# Patient Record
Sex: FEMALE | Race: WHITE
[De-identification: names, ages, dates, MRNs, and addresses within clinical notes are randomized per-mention and may not be internally consistent; named-entity substitution may affect disease eponyms.]

---

## 2017-02-22 ENCOUNTER — HOSPITAL ENCOUNTER (OUTPATIENT)
Dept: HOSPITAL 17 - CLAB | Age: 52
End: 2017-02-22
Attending: FAMILY MEDICINE
Payer: COMMERCIAL

## 2017-02-22 DIAGNOSIS — R53.83: Primary | ICD-10-CM

## 2017-02-22 DIAGNOSIS — R94.7: ICD-10-CM

## 2017-02-22 DIAGNOSIS — E55.9: ICD-10-CM

## 2017-02-22 PROCEDURE — 84144 ASSAY OF PROGESTERONE: CPT

## 2017-02-22 PROCEDURE — 36415 COLL VENOUS BLD VENIPUNCTURE: CPT

## 2017-02-22 PROCEDURE — 82306 VITAMIN D 25 HYDROXY: CPT

## 2017-04-20 ENCOUNTER — HOSPITAL ENCOUNTER (OUTPATIENT)
Dept: HOSPITAL 17 - CLAB | Age: 52
End: 2017-04-20
Attending: FAMILY MEDICINE
Payer: COMMERCIAL

## 2017-04-20 DIAGNOSIS — E55.9: Primary | ICD-10-CM

## 2017-04-20 DIAGNOSIS — R94.7: ICD-10-CM

## 2017-04-20 DIAGNOSIS — R61: ICD-10-CM

## 2017-04-20 DIAGNOSIS — Z79.899: ICD-10-CM

## 2017-04-20 PROCEDURE — 36415 COLL VENOUS BLD VENIPUNCTURE: CPT

## 2017-04-20 PROCEDURE — 82306 VITAMIN D 25 HYDROXY: CPT

## 2017-04-20 PROCEDURE — 84144 ASSAY OF PROGESTERONE: CPT

## 2017-06-16 ENCOUNTER — HOSPITAL ENCOUNTER (EMERGENCY)
Dept: HOSPITAL 17 - NEPD | Age: 52
Discharge: HOME | End: 2017-06-16
Payer: COMMERCIAL

## 2017-06-16 VITALS
HEART RATE: 88 BPM | SYSTOLIC BLOOD PRESSURE: 127 MMHG | OXYGEN SATURATION: 96 % | DIASTOLIC BLOOD PRESSURE: 81 MMHG | TEMPERATURE: 98.1 F | RESPIRATION RATE: 16 BRPM

## 2017-06-16 DIAGNOSIS — R10.32: Primary | ICD-10-CM

## 2017-06-16 DIAGNOSIS — K44.9: ICD-10-CM

## 2017-06-16 DIAGNOSIS — E03.9: ICD-10-CM

## 2017-06-16 LAB
ALP SERPL-CCNC: 109 U/L (ref 45–117)
ALT SERPL-CCNC: 31 U/L (ref 10–53)
ANION GAP SERPL CALC-SCNC: 9 MEQ/L (ref 5–15)
APTT BLD: 24.7 SEC (ref 24.3–30.1)
AST SERPL-CCNC: 22 U/L (ref 15–37)
BACTERIA #/AREA URNS HPF: (no result) /HPF
BASOPHILS # BLD AUTO: 0.1 TH/MM3 (ref 0–0.2)
BASOPHILS NFR BLD: 1.1 % (ref 0–2)
BILIRUB SERPL-MCNC: 0.2 MG/DL (ref 0.2–1)
BUN SERPL-MCNC: 22 MG/DL (ref 7–18)
CHLORIDE SERPL-SCNC: 109 MEQ/L (ref 98–107)
COLOR UR: YELLOW
COMMENT (UR): (no result)
CULTURE IF INDICATED: (no result)
EOSINOPHIL # BLD: 0.2 TH/MM3 (ref 0–0.4)
EOSINOPHIL NFR BLD: 1.6 % (ref 0–4)
ERYTHROCYTE [DISTWIDTH] IN BLOOD BY AUTOMATED COUNT: 14.2 % (ref 11.6–17.2)
GFR SERPLBLD BASED ON 1.73 SQ M-ARVRAT: 68 ML/MIN (ref 89–?)
GLUCOSE UR STRIP-MCNC: (no result) MG/DL
HCO3 BLD-SCNC: 21.8 MEQ/L (ref 21–32)
HCT VFR BLD CALC: 39.5 % (ref 35–46)
HEMO FLAGS: (no result)
HGB UR QL STRIP: (no result)
INR PPP: 1 RATIO
KETONES UR STRIP-MCNC: (no result) MG/DL
LYMPHOCYTES # BLD AUTO: 3.8 TH/MM3 (ref 1–4.8)
LYMPHOCYTES NFR BLD AUTO: 32.5 % (ref 9–44)
MCH RBC QN AUTO: 29.2 PG (ref 27–34)
MCHC RBC AUTO-ENTMCNC: 32.7 % (ref 32–36)
MCV RBC AUTO: 89.2 FL (ref 80–100)
MONOCYTES NFR BLD: 5.5 % (ref 0–8)
MUCOUS THREADS #/AREA URNS LPF: (no result) /LPF
NEUTROPHILS # BLD AUTO: 7 TH/MM3 (ref 1.8–7.7)
NEUTROPHILS NFR BLD AUTO: 59.3 % (ref 16–70)
NITRITE UR QL STRIP: (no result)
PLATELET # BLD: 288 TH/MM3 (ref 150–450)
POTASSIUM SERPL-SCNC: 3.7 MEQ/L (ref 3.5–5.1)
PROTHROMBIN TIME: 11.1 SEC (ref 9.8–11.6)
RBC # BLD AUTO: 4.42 MIL/MM3 (ref 4–5.3)
SODIUM SERPL-SCNC: 140 MEQ/L (ref 136–145)
SP GR UR STRIP: 1.03 (ref 1–1.03)
SQUAMOUS #/AREA URNS HPF: 4 /HPF (ref 0–5)
WBC # BLD AUTO: 11.7 TH/MM3 (ref 4–11)

## 2017-06-16 PROCEDURE — 74176 CT ABD & PELVIS W/O CONTRAST: CPT

## 2017-06-16 PROCEDURE — 96374 THER/PROPH/DIAG INJ IV PUSH: CPT

## 2017-06-16 PROCEDURE — 81001 URINALYSIS AUTO W/SCOPE: CPT

## 2017-06-16 PROCEDURE — 96375 TX/PRO/DX INJ NEW DRUG ADDON: CPT

## 2017-06-16 PROCEDURE — 85025 COMPLETE CBC W/AUTO DIFF WBC: CPT

## 2017-06-16 PROCEDURE — 80053 COMPREHEN METABOLIC PANEL: CPT

## 2017-06-16 PROCEDURE — 85610 PROTHROMBIN TIME: CPT

## 2017-06-16 PROCEDURE — 96361 HYDRATE IV INFUSION ADD-ON: CPT

## 2017-06-16 PROCEDURE — 99285 EMERGENCY DEPT VISIT HI MDM: CPT

## 2017-06-16 PROCEDURE — 85730 THROMBOPLASTIN TIME PARTIAL: CPT

## 2017-06-16 NOTE — RADRPT
EXAM DATE/TIME:  06/16/2017 18:55 

 

[facility] COMPARISON:     

No previous studies available for comparison.

 

 

INDICATIONS :     

Left flank pain.

                  

 

ORAL CONTRAST:      

No oral contrast ingested.

                  

 

RADIATION DOSE:     

8.42 CTDIvol (mGy) 

 

 

MEDICAL HISTORY :     

None  

 

SURGICAL HISTORY :      

None. 

 

ENCOUNTER:      

Initial

 

ACUITY:      

1 day

 

PAIN SCALE:      

7/10

 

LOCATION:       

Left flank 

 

TECHNIQUE:     

Volumetric scanning of the abdomen and pelvis was performed.  Using automated exposure control and ad
justment of the mA and/or kV according to patient size, radiation dose was kept as low as reasonably 
achievable to obtain optimal diagnostic quality images. 

 

FINDINGS:     

 

LOWER LUNGS:     

The visualized lower lungs are clear.

 

LIVER:     

1.5 cm round hypodensity in the anterior right lobe of the liver. Liver is otherwise homogeneous and 
within normal limits. Gallbladder is within normal limits.

 

SPLEEN:     

Normal size without lesion.

 

PANCREAS:     

Within normal limits. 

 

KIDNEYS:     

2.7 x 1.7 cm parapelvic cyst in the left lower pole. No renal or ureteral calculi. No evidence of hyd
ronephrosis.

 

ADRENAL GLANDS:     

Within normal limits.

 

VASCULAR:     

There is no aortic aneurysm.

 

BOWEL/MESENTERY:     

No evidence of bowel dilatation. No free air or free fluid. Appendix within normal limits. Multiple n
onspecific subcentimeter central mesenteric lymph nodes in hazy mesenteric fat.

 

ABDOMINAL WALL:     

Within normal limits.

 

RETROPERITONEUM:     

There is no lymphadenopathy.

 

BLADDER:     

Diffuse nonspecific urinary bladder wall thickening.

 

REPRODUCTIVE:     

Within normal limits.

 

INGUINAL:     

There is no lymphadenopathy or hernia.

 

MUSCULOSKELETAL:     

Moderate severity facet arthrosis lower lumbar spine.

 

CONCLUSION:     

1. No renal or ureteral calculi. No hydronephrosis.

2. Nonspecific diffuse urinary bladder wall thickening.

3. Hypodensity in the right lobe of the liver statistically most likely to represent a cyst or sheron
ioma. Left renal parapelvic hypodensity most likely to  represent a parapelvic cyst.

 

 

 

 Anson Faust MD on June 16, 2017 at 19:14           

Board Certified Radiologist.

 This report was verified electronically.

## 2017-06-16 NOTE — PD
HPI


Chief Complaint:  Flank/Kidney Pain


Time Seen by Provider:  18:26


Travel History


International Travel<30 days:  No


Contact w/Intl Traveler<30days:  No


Traveled to known affect area:  No





History of Present Illness


HPI


51-year-old female complains of left flank pain.  Patient states that the pain 

started this morning.  Patient states the pain is sharp severe pain localized 

left flank area.  Patient denies any pain radiation.  Patient states that she 

has intermittent nausea vomiting with the pain.  Patient denies any dysuria or 

frequency.  Patient denies any vaginal discharge or bleeding.  Patient denies 

any fever chills.  Patient denies any history kidney stone.  Patient has 

history of achalasia and required endoscopy frequently in the past.  Patient 

has history hypothyroidism.  On a scale of 1-10 the pain is an 8.





PFSH


Past Medical History


Blood Disorders:  No


Anxiety:  No


Depression:  Yes


Heart Rhythm Problems:  No


Cancer:  No


Cardiovascular Problems:  No


High Cholesterol:  No


Chemotherapy:  No


Chest Pain:  No


Diabetes:  No


Diminished Hearing:  No


Endocrine:  Yes


Gastrointestinal Disorders:  Yes (GERD, DYSPHAGIA, MULTI. ESOPH. DILATIONS)


GERD:  Yes


Genitourinary:  No


Hepatitis:  No


Hiatal Hernia:  Yes


Hypertension:  No


Immune Disorder:  No


Musculoskeletal:  Yes (ARTHRITIS)


Neurologic:  No


Psychiatric:  No


Reproductive:  No


Respiratory:  No


Myocardial Infarction:  No


Radiation Therapy:  No


Thyroid Disease:  Yes (HYPO)


Menopausal:  Yes


:  2


Para:  2


Tubal Ligation:  Yes





Past Surgical History


AICD:  No


Gynecologic Surgery:  Yes (TUBAL LIG., HYSTERECTOMY)


Hysterectomy:  Yes


Joint Replacement:  No


Pacemaker:  No


Other Surgery:  Yes (HYSTERECTOMY, TUBAL LIGATION)





Social History


Alcohol Use:  No


Tobacco Use:  No


Substance Use:  No





Allergies-Medications


(Allergen,Severity, Reaction):  


Coded Allergies:  


     No Known Allergies (Verified , 10/27/16)


Reported Meds & Prescriptions





Reported Meds & Active Scripts


Active


Reported


C-1000 (Ascorbic Acid) 1,000 Mg Tablet 1,000 Mg PO DAILY


Levothyroxine (Levothyroxine Sodium) 150 Mcg Tab 150 Mcg PO DAILY


Pantoprazole (Pantoprazole Sodium) 40 Mg Tab 40 Mg PO DAILY


Butalbital-Aspirin-Caffeine-Codeine -61-30 Mg Cap 1 Cap PO Q6HR PRN


     Do not exceed 6 capsules/day.


Ibuprofen 800 Mg Tab 800 Mg PO TID PRN


Topamax (Topiramate) 25 Mg Tab 25 Mg PO DAILY IN THE MORNING


Topamax (Topiramate) 50 Mg Tab 50 Mg PO HS








Review of Systems


General / Constitutional:  No: Fever


Eyes:  No: Visual changes


HENT:  No: Headaches


Cardiovascular:  No: Chest Pain or Discomfort


Respiratory:  No: Shortness of Breath


Gastrointestinal:  Positive: Nausea, Vomiting, Abdominal Pain


Genitourinary:  No: Dysuria


Musculoskeletal:  No: Pain


Skin:  No Rash


Neurologic:  No: Weakness


Psychiatric:  No: Depression


Endocrine:  No: Polydipsia


Hematologic/Lymphatic:  No: Easy Bruising





Physical Exam


Narrative


GENERAL: Well-nourished, well-developed patient.


SKIN: Focused skin assessment warm/dry.


HEAD: Normocephalic.


EYES: No scleral icterus. No injection or drainage. 


NECK: Supple, trachea midline. No JVD or lymphadenopathy.


CARDIOVASCULAR: Regular rate and rhythm without murmurs, gallops, or rubs. 


RESPIRATORY: Breath sounds equal bilaterally. No accessory muscle use.


GASTROINTESTINAL: Abdomen soft,  nondistended.  Mild tenderness on palpation 

left upper quadrant of the abdomen.  No rebound tenderness.  No mass.


MUSCULOSKELETAL: No cyanosis, or edema. 


BACK: Mild tenderness on palpation left flank area, without obvious deformity. 

No CVA tenderness. 


Neurologic exam normal.





Data


Data


Last Documented VS





Vital Signs








  Date Time  Temp Pulse Resp B/P Pulse Ox O2 Delivery O2 Flow Rate FiO2


 


17 17:59 98.1 88 16 127/81 96   








Orders





 Complete Blood Count With Diff (17 18:31)


Comprehensive Metabolic Panel (17 18:31)


Prothrombin Time / Inr (Pt) (17 18:31)


Act Partial Throm Time (Ptt) (17 18:31)


Urinalysis - C+S If Indicated (17 18:31)


Ct Abd/Pel W/O Iv Contrast (17 18:31)


Iv Access Insert/Monitor (17 18:31)


Ecg Monitoring (17 18:31)


Oximetry (17 18:31)


Morphine Inj (Morphine Inj) (17 18:45)


Ondansetron Inj (Zofran Inj) (17 18:45)


Sodium Chlor 0.9% 1000 Ml Inj (Ns 1000 M (17 18:31)


Ketorolac Inj (Toradol Inj) (17 18:45)





Labs





 Laboratory Tests








Test 17





 19:35 19:40


 


Urine Color YELLOW  


 


Urine Turbidity HAZY  


 


Urine pH 5.5  


 


Urine Specific Gravity 1.034  


 


Urine Protein TRACE mg/dL 


 


Urine Glucose (UA) NEG mg/dL 


 


Urine Ketones NEG mg/dL 


 


Urine Occult Blood NEG  


 


Urine Nitrite NEG  


 


Urine Bilirubin NEG  


 


Urine Urobilinogen LESS THAN 2.0 





 MG/DL 


 


Urine Leukocyte Esterase NEG  


 


Urine WBC 1 /hpf 


 


Urine Squamous Epithelial 4 /hpf 





Cells  


 


Urine Bacteria RARE /hpf 


 


Urine Mucus MOD /lpf 


 


Microscopic Urinalysis Comment CULT NOT 





 INDICATED 


 


White Blood Count  11.7 TH/MM3


 


Red Blood Count  4.42 MIL/MM3


 


Hemoglobin  12.9 GM/DL


 


Hematocrit  39.5 %


 


Mean Corpuscular Volume  89.2 FL


 


Mean Corpuscular Hemoglobin  29.2 PG


 


Mean Corpuscular Hemoglobin  32.7 %





Concent  


 


Red Cell Distribution Width  14.2 %


 


Platelet Count  288 TH/MM3


 


Mean Platelet Volume  8.4 FL


 


Neutrophils (%) (Auto)  59.3 %


 


Lymphocytes (%) (Auto)  32.5 %


 


Monocytes (%) (Auto)  5.5 %


 


Eosinophils (%) (Auto)  1.6 %


 


Basophils (%) (Auto)  1.1 %


 


Neutrophils # (Auto)  7.0 TH/MM3


 


Lymphocytes # (Auto)  3.8 TH/MM3


 


Monocytes # (Auto)  0.6 TH/MM3


 


Eosinophils # (Auto)  0.2 TH/MM3


 


Basophils # (Auto)  0.1 TH/MM3


 


CBC Comment  DIFF FINAL 


 


Differential Comment   


 


Prothrombin Time  11.1 SEC


 


Prothromb Time International  1.0 RATIO





Ratio  


 


Activated Partial  24.7 SEC





Thromboplast Time  


 


Sodium Level  140 MEQ/L


 


Potassium Level  3.7 MEQ/L


 


Chloride Level  109 MEQ/L


 


Carbon Dioxide Level  21.8 MEQ/L


 


Anion Gap  9 MEQ/L


 


Blood Urea Nitrogen  22 MG/DL


 


Creatinine  0.87 MG/DL


 


Estimat Glomerular Filtration  68 ML/MIN





Rate  


 


Random Glucose  82 MG/DL


 


Calcium Level  8.8 MG/DL


 


Total Bilirubin  0.2 MG/DL


 


Aspartate Amino Transf  22 U/L





(AST/SGOT)  


 


Alanine Aminotransferase  31 U/L





(ALT/SGPT)  


 


Alkaline Phosphatase  109 U/L


 


Total Protein  7.8 GM/DL


 


Albumin  4.0 GM/DL











MDM


Medical Decision Making


Medical Screen Exam Complete:  Yes


Emergency Medical Condition:  Yes


Interpretation(s)


 PM.  CT abdomen and pelvis shows no renal or ureteral calculi.  Liver 

cysts or hemangioma.


 PM.  UA is negative.


21:16 PM.  CBC within normal limit.  CMP within normal limit.  BUN 22.


Differential Diagnosis


Differential diagnosis including nephrolithiasis, Pyelonephritis, 

musculoskeletal, colitis


Narrative Course


51-year-old female with left flank pain and nausea vomiting.  Normal saline 

solution 1 25 cc an hour.  Morphine 2 mg IV.  Zofran 4 mg IV.  Toradol 30 mg IV.





Diagnosis





 Primary Impression:  


 Left flank pain


Patient Instructions:  General Instructions





***Additional Instructions:


Take medications as needed for pain.  Off work tomorrow.  Moist heat to the 

area.  Follow-up with personal physician.  Return if persistent problem or 

worse.


***Med/Other Pt SpecificInfo:  Prescription(s) given


Scripts


Tramadol (Ultram)50 Mg Tab50 Mg PO Q6H PRN (PAIN) #20 TAB  Ref 0


   Prov:Delmar Hector MD         17 


Methocarbamol (Robaxin)750 Mg Rur720 Mg PO QID  #40 TAB


   Prov:Delmar Hector MD         17


Disposition:  01 DISCHARGE HOME


Condition:  Stable








Delmar Hector MD 2017 18:41

## 2017-06-22 ENCOUNTER — HOSPITAL ENCOUNTER (OUTPATIENT)
Dept: HOSPITAL 17 - CLAB | Age: 52
End: 2017-06-22
Attending: FAMILY MEDICINE
Payer: COMMERCIAL

## 2017-06-22 DIAGNOSIS — Z79.899: Primary | ICD-10-CM

## 2017-06-22 DIAGNOSIS — Z79.890: ICD-10-CM

## 2017-06-22 PROCEDURE — 36415 COLL VENOUS BLD VENIPUNCTURE: CPT

## 2017-06-22 PROCEDURE — 84144 ASSAY OF PROGESTERONE: CPT

## 2017-06-28 ENCOUNTER — HOSPITAL ENCOUNTER (OUTPATIENT)
Dept: HOSPITAL 17 - HEND | Age: 52
End: 2017-06-28
Attending: INTERNAL MEDICINE
Payer: COMMERCIAL

## 2017-06-28 VITALS
SYSTOLIC BLOOD PRESSURE: 115 MMHG | HEART RATE: 81 BPM | RESPIRATION RATE: 18 BRPM | OXYGEN SATURATION: 100 % | DIASTOLIC BLOOD PRESSURE: 68 MMHG

## 2017-06-28 VITALS — TEMPERATURE: 97.3 F

## 2017-06-28 VITALS — WEIGHT: 213.85 LBS | HEIGHT: 67 IN | BODY MASS INDEX: 33.56 KG/M2

## 2017-06-28 VITALS
DIASTOLIC BLOOD PRESSURE: 66 MMHG | OXYGEN SATURATION: 96 % | HEART RATE: 75 BPM | TEMPERATURE: 98.1 F | SYSTOLIC BLOOD PRESSURE: 110 MMHG | RESPIRATION RATE: 16 BRPM

## 2017-06-28 DIAGNOSIS — R13.10: ICD-10-CM

## 2017-06-28 DIAGNOSIS — K21.9: Primary | ICD-10-CM

## 2017-06-28 DIAGNOSIS — R07.89: ICD-10-CM

## 2017-06-28 PROCEDURE — C1769 GUIDE WIRE: HCPCS

## 2017-06-28 PROCEDURE — 43248 EGD GUIDE WIRE INSERTION: CPT

## 2017-06-28 PROCEDURE — 93005 ELECTROCARDIOGRAM TRACING: CPT

## 2017-06-28 PROCEDURE — 00740: CPT

## 2017-06-28 PROCEDURE — 88305 TISSUE EXAM BY PATHOLOGIST: CPT

## 2017-06-28 PROCEDURE — 43239 EGD BIOPSY SINGLE/MULTIPLE: CPT

## 2017-06-28 NOTE — EKG
Date Performed: 06/28/2017       Time Performed: 10:49:49

 

PTAGE:      51 years

 

EKG:      Sinus rhythm 

 

 NORMAL ECG

 

PREVIOUS TRACING       : 02/04/2016 10.00 Compared to prior tracing no significant change

 

DOCTOR:   Catherine Hampton  Interpretating Date/Time  06/28/2017 22:51:29

## 2017-06-29 NOTE — MR
cc:

NIKKI CASAREZ

****

 

 

DATE:  06/28/2017

 

YOB: 1965

 

PROCEDURE

Panendoscopy with biopsy and esophageal dilation.

 

INDICATION FOR PROCEDURE

The patient has a history of chronic gastroesophageal reflux disease, atypical

chest pain, chronic intermittent dysphagia, previously responsive to esophageal

dilation.  Photographs and biopsies were taken.

 

PREMEDICATION

Administered by anesthesiology.

 

MONITORING

Monitoring was accomplished with pulse oximeter, EKG, blood pressure monitor.

 

PROCEDURE NOTE

After informed consent was obtained, the procedure, risks and benefits were

explained including risk of bleeding, sepsis, perforation, risk of anesthesia,

the patient was placed in the left lateral position.  The video endoscope was

inserted into the esophagus under direct visualization.  The esophagus appeared

to be normal throughout. Two biopsies were taken for random sampling to rule

out eosinophilic esophagitis.  EG junction was patent.  The stomach was

entered.  Gastric mucosa was normal, in the retroflex view, the cardia and

fundus were normal.  The gastric mucosa was also normal throughout. The pylorus

was patent.  The first, second and third portion of the duodenum were

unremarkable.  Endoscopic guidewire was placed into the antrum and a

Savary-Steven dilator 17 mm was passed over the guidewire with minimal to no

resistance.  The patient tolerated the procedure well.  No immediate

complications were noted.

 

IMPRESSION

Normal panendoscopy, biopsies were taken for sampling of the esophageal mucosa.

The patient underwent wire dilatation to 17 mm as stated above.

 

PLAN

We will follow up clinically as an outpatient. Continue acid

suppressive therapy.

 

 

                              _________________________________

                              MD SEDRICK Gutierrez/JIMMIE

D:  6/28/2017/12:34 PM

T:  6/29/2017/2:52 PM

Visit #:  [id number]

Job #:  [id number]

## 2017-09-29 ENCOUNTER — HOSPITAL ENCOUNTER (OUTPATIENT)
Dept: HOSPITAL 17 - CLAB | Age: 52
End: 2017-09-29
Attending: FAMILY MEDICINE
Payer: COMMERCIAL

## 2017-09-29 DIAGNOSIS — E55.9: ICD-10-CM

## 2017-09-29 DIAGNOSIS — E03.8: ICD-10-CM

## 2017-09-29 DIAGNOSIS — R53.83: ICD-10-CM

## 2017-09-29 DIAGNOSIS — Z79.899: ICD-10-CM

## 2017-09-29 DIAGNOSIS — E78.2: Primary | ICD-10-CM

## 2017-09-29 DIAGNOSIS — Z79.890: ICD-10-CM

## 2017-09-29 LAB
ALP SERPL-CCNC: 101 U/L (ref 45–117)
ALT SERPL-CCNC: 22 U/L (ref 10–53)
ANION GAP SERPL CALC-SCNC: 3 MEQ/L (ref 5–15)
AST SERPL-CCNC: 11 U/L (ref 15–37)
BASOPHILS # BLD AUTO: 0.1 TH/MM3 (ref 0–0.2)
BASOPHILS NFR BLD: 0.7 % (ref 0–2)
BILIRUB SERPL-MCNC: 0.2 MG/DL (ref 0.2–1)
BUN SERPL-MCNC: 18 MG/DL (ref 7–18)
CHLORIDE SERPL-SCNC: 115 MEQ/L (ref 98–107)
EOSINOPHIL # BLD: 0.2 TH/MM3 (ref 0–0.4)
EOSINOPHIL NFR BLD: 3.4 % (ref 0–4)
ERYTHROCYTE [DISTWIDTH] IN BLOOD BY AUTOMATED COUNT: 14.1 % (ref 11.6–17.2)
GFR SERPLBLD BASED ON 1.73 SQ M-ARVRAT: 83 ML/MIN (ref 89–?)
HCO3 BLD-SCNC: 25 MEQ/L (ref 21–32)
HCT VFR BLD CALC: 39.3 % (ref 35–46)
HDLC SERPL-MCNC: 59.6 MG/DL (ref 40–60)
HEMO FLAGS: (no result)
HEMOGLOBIN A1A: 1.2 %
HEMOGLOBIN A1B: 0.6 %
HEMOGLOBIN AO: 85.2 %
HEMOGLOBIN LA1C: 2 %
HEMOGLOBIN P3: 3.7 %
HGB F MFR BLD: 1.4 %
LDLC SERPL-MCNC: 83 MG/DL (ref 0–99)
LYMPHOCYTES # BLD AUTO: 2.3 TH/MM3 (ref 1–4.8)
LYMPHOCYTES NFR BLD AUTO: 31.9 % (ref 9–44)
MCH RBC QN AUTO: 29.5 PG (ref 27–34)
MCHC RBC AUTO-ENTMCNC: 32.8 % (ref 32–36)
MCV RBC AUTO: 90.1 FL (ref 80–100)
MONOCYTES NFR BLD: 7.2 % (ref 0–8)
NEUTROPHILS # BLD AUTO: 4.1 TH/MM3 (ref 1.8–7.7)
NEUTROPHILS NFR BLD AUTO: 56.8 % (ref 16–70)
PLATELET # BLD: 261 TH/MM3 (ref 150–450)
POTASSIUM SERPL-SCNC: 3.6 MEQ/L (ref 3.5–5.1)
RBC # BLD AUTO: 4.36 MIL/MM3 (ref 4–5.3)
SODIUM SERPL-SCNC: 143 MEQ/L (ref 136–145)
T3FREE SERPL-MCNC: 2.34 PG/ML (ref 2.18–3.98)
T4 SERPL-MCNC: 7.6 MCG/DL (ref 4.8–13.9)
WBC # BLD AUTO: 7.2 TH/MM3 (ref 4–11)

## 2017-09-29 PROCEDURE — 84481 FREE ASSAY (FT-3): CPT

## 2017-09-29 PROCEDURE — 84443 ASSAY THYROID STIM HORMONE: CPT

## 2017-09-29 PROCEDURE — 84436 ASSAY OF TOTAL THYROXINE: CPT

## 2017-09-29 PROCEDURE — 84144 ASSAY OF PROGESTERONE: CPT

## 2017-09-29 PROCEDURE — 83036 HEMOGLOBIN GLYCOSYLATED A1C: CPT

## 2017-09-29 PROCEDURE — 36415 COLL VENOUS BLD VENIPUNCTURE: CPT

## 2017-09-29 PROCEDURE — 82306 VITAMIN D 25 HYDROXY: CPT

## 2017-09-29 PROCEDURE — 85025 COMPLETE CBC W/AUTO DIFF WBC: CPT

## 2017-09-29 PROCEDURE — 80053 COMPREHEN METABOLIC PANEL: CPT

## 2017-09-29 PROCEDURE — 82671 ASSAY OF ESTROGENS: CPT

## 2017-09-29 PROCEDURE — 80061 LIPID PANEL: CPT

## 2017-10-02 LAB — ESTRADIOL SERPL-MCNC: <10 PG/ML

## 2017-10-03 LAB — PROGEST SERPL-MCNC: (no result) NG/ML

## 2018-05-14 ENCOUNTER — HOSPITAL ENCOUNTER (OUTPATIENT)
Dept: HOSPITAL 17 - CLAB | Age: 53
End: 2018-05-14
Attending: FAMILY MEDICINE
Payer: COMMERCIAL

## 2018-05-14 DIAGNOSIS — I10: Primary | ICD-10-CM

## 2018-05-14 DIAGNOSIS — Z79.899: ICD-10-CM

## 2018-05-14 DIAGNOSIS — E78.2: ICD-10-CM

## 2018-05-14 DIAGNOSIS — E03.8: ICD-10-CM

## 2018-05-14 LAB
ALBUMIN SERPL-MCNC: 3.8 GM/DL (ref 3.4–5)
ALP SERPL-CCNC: 112 U/L (ref 45–117)
ALT SERPL-CCNC: 35 U/L (ref 10–53)
AST SERPL-CCNC: 17 U/L (ref 15–37)
BASOPHILS # BLD AUTO: 0.1 TH/MM3 (ref 0–0.2)
BASOPHILS NFR BLD: 0.9 % (ref 0–2)
BILIRUB SERPL-MCNC: 0.2 MG/DL (ref 0.2–1)
BUN SERPL-MCNC: 23 MG/DL (ref 7–18)
CALCIUM SERPL-MCNC: 8.6 MG/DL (ref 8.5–10.1)
CHLORIDE SERPL-SCNC: 111 MEQ/L (ref 98–107)
CHOLEST SERPL-MCNC: 145 MG/DL (ref 120–200)
CHOLESTEROL/ HDL RATIO: 2.63 RATIO
CREAT SERPL-MCNC: 0.8 MG/DL (ref 0.5–1)
EOSINOPHIL # BLD: 0.3 TH/MM3 (ref 0–0.4)
EOSINOPHIL NFR BLD: 2.9 % (ref 0–4)
ERYTHROCYTE [DISTWIDTH] IN BLOOD BY AUTOMATED COUNT: 13.9 % (ref 11.6–17.2)
GFR SERPLBLD BASED ON 1.73 SQ M-ARVRAT: 75 ML/MIN (ref 89–?)
HCO3 BLD-SCNC: 23.1 MEQ/L (ref 21–32)
HCT VFR BLD CALC: 39.7 % (ref 35–46)
HDLC SERPL-MCNC: 55.1 MG/DL (ref 40–60)
HGB BLD-MCNC: 13.3 GM/DL (ref 11.6–15.3)
LDLC SERPL-MCNC: 80 MG/DL (ref 0–99)
LYMPHOCYTES # BLD AUTO: 2.8 TH/MM3 (ref 1–4.8)
LYMPHOCYTES NFR BLD AUTO: 31.2 % (ref 9–44)
MCH RBC QN AUTO: 29.7 PG (ref 27–34)
MCHC RBC AUTO-ENTMCNC: 33.4 % (ref 32–36)
MCV RBC AUTO: 88.8 FL (ref 80–100)
MONOCYTE #: 0.7 TH/MM3 (ref 0–0.9)
MONOCYTES NFR BLD: 7.8 % (ref 0–8)
NEUTROPHILS # BLD AUTO: 5.1 TH/MM3 (ref 1.8–7.7)
NEUTROPHILS NFR BLD AUTO: 57.2 % (ref 16–70)
PLATELET # BLD: 289 TH/MM3 (ref 150–450)
PMV BLD AUTO: 7.9 FL (ref 7–11)
PROT SERPL-MCNC: 7 GM/DL (ref 6.4–8.2)
RBC # BLD AUTO: 4.47 MIL/MM3 (ref 4–5.3)
SODIUM SERPL-SCNC: 142 MEQ/L (ref 136–145)
T4 SERPL-MCNC: 10.1 MCG/DL (ref 4.8–13.9)
TRIGL SERPL-MCNC: 50 MG/DL (ref 42–150)
WBC # BLD AUTO: 8.9 TH/MM3 (ref 4–11)

## 2018-05-14 PROCEDURE — 84480 ASSAY TRIIODOTHYRONINE (T3): CPT

## 2018-05-14 PROCEDURE — 85025 COMPLETE CBC W/AUTO DIFF WBC: CPT

## 2018-05-14 PROCEDURE — 36415 COLL VENOUS BLD VENIPUNCTURE: CPT

## 2018-05-14 PROCEDURE — 84436 ASSAY OF TOTAL THYROXINE: CPT

## 2018-05-14 PROCEDURE — 84443 ASSAY THYROID STIM HORMONE: CPT

## 2018-05-14 PROCEDURE — 80061 LIPID PANEL: CPT

## 2018-05-14 PROCEDURE — 80053 COMPREHEN METABOLIC PANEL: CPT

## 2018-05-27 ENCOUNTER — HOSPITAL ENCOUNTER (EMERGENCY)
Dept: HOSPITAL 17 - NEPD | Age: 53
Discharge: HOME | End: 2018-05-27
Payer: COMMERCIAL

## 2018-05-27 VITALS — BODY MASS INDEX: 25.95 KG/M2 | WEIGHT: 165.35 LBS | HEIGHT: 67 IN

## 2018-05-27 VITALS
DIASTOLIC BLOOD PRESSURE: 66 MMHG | TEMPERATURE: 97.3 F | SYSTOLIC BLOOD PRESSURE: 130 MMHG | OXYGEN SATURATION: 98 % | RESPIRATION RATE: 20 BRPM | HEART RATE: 89 BPM

## 2018-05-27 DIAGNOSIS — S40.021A: Primary | ICD-10-CM
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
## 2018-05-27 PROCEDURE — 99282 EMERGENCY DEPT VISIT SF MDM: CPT

## 2018-05-27 NOTE — PD
HPI


Chief Complaint:  Fall


Time Seen by Provider:  23:01


Travel History


International Travel<30 days:  No


Contact w/Intl Traveler<30days:  No


Traveled to known affect area:  No





History of Present Illness


HPI


The patient was seen and examined in the presence of the nurse.  This patient 

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  She landed on her left side.

  No head injury.  She has some discomfort in both arms.  She has been 

ambulatory.  This happened 2 hours ago.  Symptom severity is mild





PFSH


Past Medical History


Pregnant?:  Not Pregnant





Past Surgical History


Hysterectomy:  Yes


Neurologic Surgery:  Yes (L5-S1 disk )





Social History


Alcohol Use:  No


Tobacco Use:  No


Substance Use:  No





Allergies-Medications


(Allergen,Severity, Reaction):  


Coded Allergies:  


     No Known Allergies (Unverified , 5/27/18)





Review of Systems


HENT:  No: Headaches


Cardiovascular:  No: Chest Pain or Discomfort


Respiratory:  No: Cough


Gastrointestinal:  No: Vomiting





Physical Exam


Narrative


SKIN: Focused skin assessment reveals no rash or ulcers. Skin is warm and dry.  

Palpation shows no induration or nodules.





GASTROINTESTINAL:  Abdomen soft, non-tender, nondistended. Positive bowel 

sounds.  No hepato-splenomegaly, or palpable masses. No guarding.





NECK:  Symmetrical appearance, midline trachea.  No mass or crepitus.  Thyroid 

without enlargement, tenderness, or mass.





Psych: Normal mood and affect.  Normal insight and judgment.





She has very minor bruising just proximal to the right elbow without bony 

tenderness.





Data


Data


Last Documented VS





Vital Signs








  Date Time  Temp Pulse Resp B/P (MAP) Pulse Ox O2 Delivery O2 Flow Rate FiO2


 


5/27/18 22:56 97.3 89 20 130/66 (87) 98   











MDM


Medical Decision Making


Medical Screen Exam Complete:  Yes


Emergency Medical Condition:  Yes


Medical Record Reviewed:  Yes


Differential Diagnosis


Contusion, fracture, dislocation


Narrative Course


I have reviewed the patient's electronic medical record.





Patient has some minor soft tissue contusion to the right arm.  I expect full 

recovery.  I do not think she needs any x-rays.





Diagnosis





 Primary Impression:  


 Contusion of right upper arm, initial encounter





***Additional Instructions:  


The patient was advised to follow up with their physician and return if they 

worsen.


***Med/Other Pt SpecificInfo:  Other


Disposition:  01 DISCHARGE HOME


Condition:  Stable











Eloy Mansfield MD May 27, 2018 23:25